# Patient Record
Sex: MALE | URBAN - METROPOLITAN AREA
[De-identification: names, ages, dates, MRNs, and addresses within clinical notes are randomized per-mention and may not be internally consistent; named-entity substitution may affect disease eponyms.]

---

## 2022-05-21 ENCOUNTER — OFFICE VISIT (OUTPATIENT)
Dept: URBAN - METROPOLITAN AREA CLINIC 71 | Facility: CLINIC | Age: 82
End: 2022-05-21
Payer: COMMERCIAL

## 2022-05-21 DIAGNOSIS — H35.363 DRUSEN (DEGENERATIVE) OF MACULA, BILATERAL: ICD-10-CM

## 2022-05-21 DIAGNOSIS — H43.813 VITREOUS DEGENERATION, BILATERAL: ICD-10-CM

## 2022-05-21 DIAGNOSIS — H35.372 PUCKERING OF MACULA, LEFT EYE: ICD-10-CM

## 2022-05-21 DIAGNOSIS — H25.813 COMBINED FORMS OF AGE-RELATED CATARACT, BILATERAL: Primary | ICD-10-CM

## 2022-05-21 PROCEDURE — 99204 OFFICE O/P NEW MOD 45 MIN: CPT

## 2022-05-21 PROCEDURE — 92134 CPTRZ OPH DX IMG PST SGM RTA: CPT

## 2022-05-21 ASSESSMENT — INTRAOCULAR PRESSURE
OD: 13
OS: 14

## 2022-05-21 NOTE — IMPRESSION/PLAN
Impression: Drusen (degenerative) of macula, bilateral: H35.363.

small drusen OU 
OCT ordered and done today Plan: Discussed DX and possible outcomes with pt in detail Explained to pt that there is no treatment for drusen Recommend pt to get yearly DFE with OCT for monitoring. Pt voices understanding.

## 2022-05-21 NOTE — IMPRESSION/PLAN
Impression: Puckering of macula, left eye: H35.372. Plan: Discussed diagnosis in detail with patient. Discussed treatment options with patient, though treatment is not required or recommended at this time. Will continue to observe condition and or symptoms. Reassured patient of current condition and treatment.

## 2022-09-19 ENCOUNTER — OFFICE VISIT (OUTPATIENT)
Dept: URBAN - METROPOLITAN AREA CLINIC 71 | Facility: CLINIC | Age: 82
End: 2022-09-19
Payer: COMMERCIAL

## 2022-09-19 DIAGNOSIS — H35.372 PUCKERING OF MACULA, LEFT EYE: ICD-10-CM

## 2022-09-19 DIAGNOSIS — H35.363 DRUSEN (DEGENERATIVE) OF MACULA, BILATERAL: ICD-10-CM

## 2022-09-19 DIAGNOSIS — H25.813 COMBINED FORMS OF AGE-RELATED CATARACT, BILATERAL: Primary | ICD-10-CM

## 2022-09-19 PROCEDURE — 99213 OFFICE O/P EST LOW 20 MIN: CPT

## 2022-09-19 ASSESSMENT — VISUAL ACUITY
OD: 20/100
OS: 20/60

## 2022-09-19 ASSESSMENT — INTRAOCULAR PRESSURE
OD: 13
OS: 10

## 2022-09-19 NOTE — IMPRESSION/PLAN
Impression: Combined forms of age-related cataract, bilateral: H25.813. Plan: Discussed recommendation for cataract extraction w/ IOL due to current BCVA and glare. Pt's symptoms are affecting daily life at distance and near. Discussed options for surgery. Recommend OD first, then OS. Patient elects to move forward with surgery. Full discussion R, B, A. Discussed IOL options. All questions answered. Patient wants surgery.

## 2022-09-19 NOTE — IMPRESSION/PLAN
Impression: Drusen (degenerative) of macula, bilateral: H35.363. Plan: Will continue to monitor. Patient to call with any changes in vision.

## 2022-11-23 ENCOUNTER — PRE-OPERATIVE VISIT (OUTPATIENT)
Dept: URBAN - METROPOLITAN AREA CLINIC 71 | Facility: CLINIC | Age: 82
End: 2022-11-23
Payer: COMMERCIAL

## 2022-11-23 DIAGNOSIS — H25.813 COMBINED FORMS OF AGE-RELATED CATARACT, BILATERAL: Primary | ICD-10-CM

## 2022-12-20 ENCOUNTER — PRE-OPERATIVE VISIT (OUTPATIENT)
Dept: URBAN - METROPOLITAN AREA CLINIC 71 | Facility: CLINIC | Age: 82
End: 2022-12-20
Payer: COMMERCIAL

## 2022-12-20 DIAGNOSIS — H43.813 VITREOUS DEGENERATION, BILATERAL: ICD-10-CM

## 2022-12-20 DIAGNOSIS — H35.363 DRUSEN (DEGENERATIVE) OF MACULA, BILATERAL: ICD-10-CM

## 2022-12-20 DIAGNOSIS — H25.813 COMBINED FORMS OF AGE-RELATED CATARACT, BILATERAL: Primary | ICD-10-CM

## 2022-12-20 DIAGNOSIS — H35.372 PUCKERING OF MACULA, LEFT EYE: ICD-10-CM

## 2022-12-20 PROCEDURE — 99213 OFFICE O/P EST LOW 20 MIN: CPT | Performed by: OPHTHALMOLOGY

## 2022-12-20 RX ORDER — KETOROLAC TROMETHAMINE 5 MG/ML
0.5 % SOLUTION OPHTHALMIC
Qty: 5 | Refills: 0 | Status: ACTIVE
Start: 2022-12-20

## 2022-12-20 RX ORDER — PREDNISOLONE ACETATE 10 MG/ML
1 % SUSPENSION/ DROPS OPHTHALMIC
Qty: 5 | Refills: 0 | Status: ACTIVE
Start: 2022-12-20

## 2022-12-20 RX ORDER — CIPROFLOXACIN HYDROCHLORIDE 3 MG/ML
0.3 % SOLUTION/ DROPS OPHTHALMIC
Qty: 5 | Refills: 0 | Status: INACTIVE
Start: 2022-12-20 | End: 2022-12-21

## 2022-12-20 NOTE — IMPRESSION/PLAN
Impression: Drusen (degenerative) of macula, bilateral: H35.363. Plan: Optos ordered- no sign of any fluid noted.

## 2022-12-20 NOTE — IMPRESSION/PLAN
Impression: Combined forms of age-related cataract, bilateral: H25.813. Plan: Reviewed Irma Johnson, OCT & Optos. Heart and lungs clear. R/B/A discussed. Proceed with traditional cataract surgery with dextenza. OD first for distance, then OS for distance. Patient voices understanding that cataract surgery is not a guarantee for 20/20 vision and that glasses may be needed after the surgery. Patient declines ORA. No clearance needed per Dr. Karla Hayes. 
Start ketorolac BID for 3 weeks starting the day prior to surgery on the surgical eye.

## 2023-01-16 ENCOUNTER — SURGERY (OUTPATIENT)
Dept: URBAN - METROPOLITAN AREA SURGERY 44 | Facility: SURGERY | Age: 83
End: 2023-01-16
Payer: COMMERCIAL

## 2023-01-16 ENCOUNTER — SURGERY (OUTPATIENT)
Dept: URBAN - METROPOLITAN AREA SURGERY 45 | Facility: SURGERY | Age: 83
End: 2023-01-16
Payer: COMMERCIAL

## 2023-01-16 PROCEDURE — 66984 XCAPSL CTRC RMVL W/O ECP: CPT | Performed by: OPHTHALMOLOGY

## 2023-01-17 ENCOUNTER — POST-OPERATIVE VISIT (OUTPATIENT)
Dept: URBAN - METROPOLITAN AREA CLINIC 72 | Facility: CLINIC | Age: 83
End: 2023-01-17
Payer: COMMERCIAL

## 2023-01-17 DIAGNOSIS — Z48.810 ENCOUNTER FOR SURGICAL AFTERCARE FOLLOWING SURGERY ON A SENSE ORGAN: Primary | ICD-10-CM

## 2023-01-17 PROCEDURE — 99024 POSTOP FOLLOW-UP VISIT: CPT | Performed by: OPTOMETRIST

## 2023-01-17 ASSESSMENT — INTRAOCULAR PRESSURE
OS: 15
OD: 16

## 2023-01-17 NOTE — IMPRESSION/PLAN
Impression: S/P Cataract Extraction by phacoemulsification with IOL placement OD - 1 Day. Encounter for surgical aftercare following surgery on a sense organ  Z48.810. Excellent post op course   Post operative instructions reviewed - Condition is improving - Plan: pt is doing well at this time. Due to pt being on gtts recommend pt to RTC in 1 wk for follow up on inflammation and to discuss taper of gtts. Pt understands  --Continue Prednisolone acetate 1%--Continue Ketorolac 0.5%--Continue Ofloxacin 0.3%--Advised patient to use artificial tears for comfort.

## 2023-01-24 ENCOUNTER — POST-OPERATIVE VISIT (OUTPATIENT)
Dept: URBAN - METROPOLITAN AREA CLINIC 72 | Facility: CLINIC | Age: 83
End: 2023-01-24
Payer: COMMERCIAL

## 2023-01-24 DIAGNOSIS — Z48.810 ENCOUNTER FOR SURGICAL AFTERCARE FOLLOWING SURGERY ON A SENSE ORGAN: Primary | ICD-10-CM

## 2023-01-24 PROCEDURE — 99024 POSTOP FOLLOW-UP VISIT: CPT | Performed by: OPTOMETRIST

## 2023-01-24 ASSESSMENT — INTRAOCULAR PRESSURE
OD: 16
OS: 14

## 2023-01-24 NOTE — IMPRESSION/PLAN
Impression: S/P Cataract Extraction by phacoemulsification with IOL placement OD - 8 Days. Encounter for surgical aftercare following surgery on a sense organ  Z48.810. Excellent post op course   Post operative instructions reviewed - Condition is improving - Plan: Ok to proceed with second. Educated pt on R/B/A to surgery, RL2. Questions answered. Pt voices understanding and wishes to proceed. Recommend CE/IOL OS Advised patient to use artificial tears for comfort. --Discontinue Ofloxacin 0.3%--Taper Prednisolone acetate 1% TID x 1 wk, BID x 1wk, QD x 1wk, then d/c--Discontinue Ketorolac 0.5%

## 2023-01-31 ENCOUNTER — POST-OPERATIVE VISIT (OUTPATIENT)
Dept: URBAN - METROPOLITAN AREA CLINIC 72 | Facility: CLINIC | Age: 83
End: 2023-01-31
Payer: COMMERCIAL

## 2023-01-31 DIAGNOSIS — Z96.1 PRESENCE OF INTRAOCULAR LENS: Primary | ICD-10-CM

## 2023-01-31 PROCEDURE — 99024 POSTOP FOLLOW-UP VISIT: CPT | Performed by: OPTOMETRIST

## 2023-01-31 RX ORDER — PREDNISOLONE ACETATE 10 MG/ML
1 % SUSPENSION/ DROPS OPHTHALMIC
Qty: 5 | Refills: 0 | Status: ACTIVE
Start: 2023-01-31

## 2023-01-31 ASSESSMENT — INTRAOCULAR PRESSURE
OD: 13
OS: 14

## 2023-01-31 NOTE — IMPRESSION/PLAN
Impression: S/P Cataract Extraction by phacoemulsification with IOL placement OS - 1 Day. Presence of intraocular lens  Z96.1. Excellent post op course   Post operative instructions reviewed - Condition is improving - Plan: Pt is doing well recommend pt to continue with gtt regimen 
will reevaluate in 1 month --Continue Ketorolac 0.5%--Continue Ofloxacin 0.3%--Continue Prednisolone acetate 1%--Advised patient to use artificial tears for comfort.

## 2023-02-28 ENCOUNTER — POST-OPERATIVE VISIT (OUTPATIENT)
Dept: URBAN - METROPOLITAN AREA CLINIC 72 | Facility: CLINIC | Age: 83
End: 2023-02-28
Payer: COMMERCIAL

## 2023-02-28 DIAGNOSIS — H52.4 PRESBYOPIA: Primary | ICD-10-CM

## 2023-02-28 DIAGNOSIS — Z96.1 PRESENCE OF INTRAOCULAR LENS: ICD-10-CM

## 2023-02-28 PROCEDURE — 99024 POSTOP FOLLOW-UP VISIT: CPT | Performed by: OPTOMETRIST

## 2023-02-28 ASSESSMENT — VISUAL ACUITY
OD: 20/20
OS: 20/80

## 2023-02-28 ASSESSMENT — INTRAOCULAR PRESSURE
OD: 14
OS: 15

## 2023-02-28 NOTE — IMPRESSION/PLAN
Impression: S/P Cataract Extraction by phacoemulsification with IOL placement OS - 29 Days. Presence of intraocular lens  Z96.1. 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 Recommend pt to D/c gtts and use AFT at least QID. If VA gets worse pt to RTC before 6 months 
mild  haze seen on today's exam 
PCO explained in detail with pt Will reevaluate in 6 months with DFE --Advised patient to use artificial tears for comfort.